# Patient Record
Sex: MALE | Race: BLACK OR AFRICAN AMERICAN
[De-identification: names, ages, dates, MRNs, and addresses within clinical notes are randomized per-mention and may not be internally consistent; named-entity substitution may affect disease eponyms.]

---

## 2018-03-31 ENCOUNTER — HOSPITAL ENCOUNTER (EMERGENCY)
Dept: HOSPITAL 25 - UCEAST | Age: 28
Discharge: HOME | End: 2018-03-31
Payer: COMMERCIAL

## 2018-03-31 VITALS — DIASTOLIC BLOOD PRESSURE: 66 MMHG | SYSTOLIC BLOOD PRESSURE: 133 MMHG
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
## 2018-03-31 DIAGNOSIS — S39.012A: Primary | ICD-10-CM
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
## 2018-03-31 PROCEDURE — G0463 HOSPITAL OUTPT CLINIC VISIT: HCPCS

## 2018-03-31 PROCEDURE — 99212 OFFICE O/P EST SF 10 MIN: CPT

## 2018-03-31 PROCEDURE — 81003 URINALYSIS AUTO W/O SCOPE: CPT
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
## 2018-07-31 ENCOUNTER — HOSPITAL ENCOUNTER (EMERGENCY)
Dept: HOSPITAL 25 - ED | Age: 28
Discharge: HOME | End: 2018-07-31
Payer: COMMERCIAL

## 2018-07-31 VITALS — DIASTOLIC BLOOD PRESSURE: 76 MMHG | SYSTOLIC BLOOD PRESSURE: 124 MMHG
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
## 2018-07-31 DIAGNOSIS — T20.10XA: Primary | ICD-10-CM
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
## 2018-07-31 PROCEDURE — 99282 EMERGENCY DEPT VISIT SF MDM: CPT
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
## 2019-02-10 ENCOUNTER — HOSPITAL ENCOUNTER (EMERGENCY)
Dept: HOSPITAL 25 - ED | Age: 29
Discharge: HOME | End: 2019-02-10
Payer: COMMERCIAL

## 2019-02-10 VITALS — SYSTOLIC BLOOD PRESSURE: 135 MMHG | DIASTOLIC BLOOD PRESSURE: 76 MMHG
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
## 2019-02-10 DIAGNOSIS — M51.26: ICD-10-CM
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
## 2019-02-10 DIAGNOSIS — M62.830: Primary | ICD-10-CM

## 2019-02-10 DIAGNOSIS — S39.012A: ICD-10-CM

## 2019-02-10 PROCEDURE — 99282 EMERGENCY DEPT VISIT SF MDM: CPT

## 2019-02-10 NOTE — ED
Back Pain





- HPI Summary


HPI Summary: 


Pt. is a 28 y.o male who presents to the ER for low back pain x 3 days. Pt. 

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 He notes 3 days 

ago he had a very busy, active work day and developed low back pain. Pain 

occasionally radiates into left leg with tingling. He denies fever, leg numbness

/weakness, bowel or bladder incontinence or retention. Pt. states he has had 

similar sxs in the past. Pt. states he has been taking motrin and naproxen with 

little relief. Sxs are mild in severity. Movement makes sxs worse. Lying flat 

improves sxs. 








- History of Current Complaint


Chief Complaint: EDBackInjuryPain


Stated Complaint: BACK PAIN


Time Seen by Provider: 02/10/19 16:47


Hx Obtained From: Patient


Pain Intensity: 10





- Allergies/Home Medications


Allergies/Adverse Reactions: 


 Allergies











Allergy/AdvReac Type Severity Reaction Status Date / Time


 


No Known Allergies Allergy   Verified 03/31/18 17:12














PMH/Surg Hx/FS Hx/Imm Hx


Previously Healthy: Yes


Respiratory History: 


   Denies: Hx Chronic Obstructive Pulmonary Disease (COPD)


Sensory History: 


   Denies: Hx Legally Blind, Hx Deafness


Opthamlomology History: 


   Denies: Hx Legally Blind


Neurological History: 


   Denies: Hx Dementia


Infectious Disease History: No


Infectious Disease History: 


   Denies: Traveled Outside the [place] in Last 30 Days





- Family History


Known Family History: Positive: None, Non-Contributory





- Social History


Occupation: Employed Full-time


Lives: With Family


Alcohol Use: Occasionally


Substance Use Type: Reports: None


Smoking Status (MU): Never Smoked Tobacco





Review of Systems


Constitutional: Negative


Negative: Fever, Chills


Cardiovascular: Negative


Respiratory: Negative


Gastrointestinal: Negative


Genitourinary: Negative


Positive: Other - low back pain 


Neurological: Negative


Negative: Weakness, Paresthesia, Numbness


All Other Systems Reviewed And Are Negative: Yes





Physical Exam


Triage Information Reviewed: Yes


Vital Signs On Initial Exam: 


 Initial Vitals











Temp Pulse Resp BP Pulse Ox


 


 96.4 F   63   18   144/81   100 


 


 02/10/19 16:26  02/10/19 16:26  02/10/19 16:26  02/10/19 16:26  02/10/19 16:26











Vital Signs Reviewed: Yes


Appearance: Positive: Well-Appearing - Pt. lying flat in bed. Appears 

uncomfortable with movement but nontoxic.


Skin: Positive: Warm, Dry


Head/Face: Positive: Normal Head/Face Inspection


Eyes: Positive: Normal, EOMI


Neck: Positive: Supple


Musculoskeletal: Positive: Other - 5/5 strength in bilateral LEs with flexion 

and dorsiflexion. Positive straight leg test on left at about 15 degrees. No 

sensory deficits. Pain and tightening noted to left upper paraspinal lumbar 

muscle. No midline tenderness.


Neurological: Positive: Normal, CN Intact II-III


Psychiatric: Positive: Affect/Mood Appropriate





Diagnostics





- Vital Signs


 Vital Signs











  Temp Pulse Resp BP Pulse Ox


 


 02/10/19 16:26  96.4 F  63  18  144/81  100














- Laboratory


Lab Statement: Any lab studies that have been ordered have been reviewed, and 

results considered in the medical decision making process.





Back Pain Course/Dx





- Course


Course Of Treatment: Pt. presenting with atraumatic low back pain. He is 

afebrile without signs of cuada equina syndrome. Suspect mild disc herniation 

based on exam and muscle spasm. Will place pt. on a course of steroids since 

NSAIDs have been ineffective. Flexeril also rx. To apply warm compresses. Avoid 

heavy lifting. Gentle stretches. Close f.u with PCP and return to ER if sxs 

change or worsen. Pt. understands and agrees with plan.





- Diagnoses


Differential Diagnosis/HQI/PQRI: Positive: Cauda Equina Syndrome, Compressive 

Cord Syndrome, Epidural Abscess, Fracture, Herniated Disc, Strain, Sprain


Provider Diagnoses: 


 Muscle spasm, Disc herniation, Lumbar strain








Discharge





- Sign-Out/Discharge


Documenting (check all that apply): Patient Departure


Patient Received Moderate/Deep Sedation with Procedure: No





- Discharge Plan


Condition: Good


Disposition: HOME


Prescriptions: 


Cyclobenzaprine TAB* [Flexeril 10 MG TAB*] 10 mg PO TID PRN #12 tab


 PRN Reason: Pain


methylPREDNISolone [Medrol Dosepak 4 MG*] 0 mg PO .SEE ROSANGELA INSTRUCTION #1 tab


Patient Education Materials:  Lumbar Disc Herniation (ED), Muscle Spasm (ED), 

Lower Back Exercises (ED)


Forms:  *Work Release


Referrals: 


Logan Iqbal DO [Primary Care Provider] - 


Additional Instructions: 


Call your PCP tomorrow to schedule a follow up appointment


Take medication as directed


Can continue ibuprofen or naproxen as directed


Apply warm compresses


Avoid heavy lifting


Gentle stretching and massage


Return to ER if symptoms change or worsen





- Billing Disposition and Condition


Condition: GOOD


Disposition: Home